# Patient Record
Sex: MALE | Race: WHITE | Employment: OTHER | URBAN - METROPOLITAN AREA
[De-identification: names, ages, dates, MRNs, and addresses within clinical notes are randomized per-mention and may not be internally consistent; named-entity substitution may affect disease eponyms.]

---

## 2017-10-13 RX ORDER — LEVOTHYROXINE SODIUM 0.05 MG/1
TABLET ORAL
Qty: 14 TABLET | Refills: 0 | Status: SHIPPED | OUTPATIENT
Start: 2017-10-13 | End: 2017-10-23 | Stop reason: SDUPTHER

## 2017-10-24 RX ORDER — LEVOTHYROXINE SODIUM 0.05 MG/1
TABLET ORAL
Qty: 14 TABLET | Refills: 0 | Status: SHIPPED | OUTPATIENT
Start: 2017-10-24 | End: 2017-10-26 | Stop reason: SDUPTHER

## 2017-10-26 RX ORDER — LEVOTHYROXINE SODIUM 0.05 MG/1
TABLET ORAL
Qty: 90 TABLET | Refills: 1 | Status: SHIPPED | OUTPATIENT
Start: 2017-10-26 | End: 2017-10-31 | Stop reason: SDUPTHER

## 2017-10-31 ENCOUNTER — OFFICE VISIT (OUTPATIENT)
Dept: FAMILY MEDICINE CLINIC | Age: 67
End: 2017-10-31
Payer: MEDICARE

## 2017-10-31 VITALS
HEIGHT: 66 IN | BODY MASS INDEX: 25.55 KG/M2 | DIASTOLIC BLOOD PRESSURE: 70 MMHG | HEART RATE: 68 BPM | SYSTOLIC BLOOD PRESSURE: 126 MMHG | WEIGHT: 159 LBS

## 2017-10-31 DIAGNOSIS — Z23 IMMUNIZATION DUE: ICD-10-CM

## 2017-10-31 DIAGNOSIS — E03.9 HYPOTHYROIDISM, UNSPECIFIED TYPE: ICD-10-CM

## 2017-10-31 DIAGNOSIS — G20 PARKINSON DISEASE (HCC): Primary | ICD-10-CM

## 2017-10-31 PROCEDURE — 1036F TOBACCO NON-USER: CPT | Performed by: FAMILY MEDICINE

## 2017-10-31 PROCEDURE — G8598 ASA/ANTIPLAT THER USED: HCPCS | Performed by: FAMILY MEDICINE

## 2017-10-31 PROCEDURE — G8419 CALC BMI OUT NRM PARAM NOF/U: HCPCS | Performed by: FAMILY MEDICINE

## 2017-10-31 PROCEDURE — G0009 ADMIN PNEUMOCOCCAL VACCINE: HCPCS | Performed by: FAMILY MEDICINE

## 2017-10-31 PROCEDURE — 90732 PPSV23 VACC 2 YRS+ SUBQ/IM: CPT | Performed by: FAMILY MEDICINE

## 2017-10-31 PROCEDURE — G8427 DOCREV CUR MEDS BY ELIG CLIN: HCPCS | Performed by: FAMILY MEDICINE

## 2017-10-31 PROCEDURE — 1123F ACP DISCUSS/DSCN MKR DOCD: CPT | Performed by: FAMILY MEDICINE

## 2017-10-31 PROCEDURE — 4040F PNEUMOC VAC/ADMIN/RCVD: CPT | Performed by: FAMILY MEDICINE

## 2017-10-31 PROCEDURE — 3017F COLORECTAL CA SCREEN DOC REV: CPT | Performed by: FAMILY MEDICINE

## 2017-10-31 PROCEDURE — G0008 ADMIN INFLUENZA VIRUS VAC: HCPCS | Performed by: FAMILY MEDICINE

## 2017-10-31 PROCEDURE — 90688 IIV4 VACCINE SPLT 0.5 ML IM: CPT | Performed by: FAMILY MEDICINE

## 2017-10-31 PROCEDURE — G8484 FLU IMMUNIZE NO ADMIN: HCPCS | Performed by: FAMILY MEDICINE

## 2017-10-31 PROCEDURE — 99213 OFFICE O/P EST LOW 20 MIN: CPT | Performed by: FAMILY MEDICINE

## 2017-10-31 RX ORDER — LEVOTHYROXINE SODIUM 0.05 MG/1
TABLET ORAL
Qty: 90 TABLET | Refills: 3 | Status: SHIPPED | OUTPATIENT
Start: 2017-10-31 | End: 2020-02-27

## 2017-10-31 ASSESSMENT — PATIENT HEALTH QUESTIONNAIRE - PHQ9
SUM OF ALL RESPONSES TO PHQ9 QUESTIONS 1 & 2: 0
1. LITTLE INTEREST OR PLEASURE IN DOING THINGS: 0
2. FEELING DOWN, DEPRESSED OR HOPELESS: 0
SUM OF ALL RESPONSES TO PHQ QUESTIONS 1-9: 0

## 2017-10-31 ASSESSMENT — ENCOUNTER SYMPTOMS
BACK PAIN: 1
ABDOMINAL PAIN: 0
COUGH: 0
WHEEZING: 0
DIARRHEA: 0
BLOOD IN STOOL: 0
CONSTIPATION: 0
SHORTNESS OF BREATH: 0

## 2017-10-31 NOTE — PROGRESS NOTES
left index     Family History   Problem Relation Age of Onset    High Blood Pressure Mother     Cancer Mother      lymphoma    Diabetes Father     High Blood Pressure Father     Cancer Father      lung    Cancer Sister      basal and squamous cell skin    Other Maternal Grandfather      suicide, shot himself     Social History   Substance Use Topics    Smoking status: Former Smoker     Quit date: 1/29/1998    Smokeless tobacco: Never Used    Alcohol use No      Current Outpatient Prescriptions   Medication Sig Dispense Refill    levothyroxine (SYNTHROID) 50 MCG tablet TAKE 1 TABLET DAILY 90 tablet 3    simvastatin (ZOCOR) 20 MG tablet TAKE 1 TABLET NIGHTLY 90 tablet 1    ALPRAZolam (XANAX) 0.5 MG tablet Take 1 tablet by mouth 3 times daily as needed 270 tablet 0    minocycline (MINOCIN;DYNACIN) 50 MG capsule TAKE 1 CAPSULE TWICE A  capsule 3    divalproex (DEPAKOTE) 500 MG DR tablet Take 1 tablet by mouth every 8 hours for 14 days. 42 tablet 0    QUEtiapine (SEROQUEL) 200 MG tablet Take 1 tablet by mouth daily for 14 days. 14 tablet 0    QUEtiapine (SEROQUEL) 400 MG tablet Take 1 tablet by mouth nightly for 14 days. 14 tablet 0    gabapentin (NEURONTIN) 300 MG capsule Take 300 mg by mouth 2 times daily.  docusate sodium (COLACE) 100 MG capsule Take 100 mg by mouth 2 times daily.  amLODIPine (NORVASC) 5 MG tablet Take 5 mg by mouth daily.  AZILECT 0.5 MG TABS TAKE 1 TABLET DAILY 30 tablet 1    sildenafil (VIAGRA) 100 MG tablet Take 1 tablet by mouth as needed for Erectile Dysfunction. 12 tablet 3    aspirin 81 MG EC tablet Take 81 mg by mouth daily. No current facility-administered medications for this visit. No Known Allergies      Subjective:   Review of Systems   Constitutional: Negative for chills, diaphoresis, fatigue and fever. HENT: Negative for congestion and hearing loss. Eyes: Negative for visual disturbance.    Respiratory: Negative for cough, 23-valent >= 3yo subcutaneous/IM (PNEUMOVAX 23)   3. Hypothyroidism, unspecified type  levothyroxine (SYNTHROID) 50 MCG tablet    CBC Auto Differential    Comprehensive Metabolic Panel    Lipid Panel    T4    TSH without Reflex    Psa screening         Plan:      No Follow-up on file. Orders Placed This Encounter   Procedures    INFLUENZA, QUADV, 3 YRS AND OLDER, IM, MDV, 0.5ML (FLUZONE QUADV)    Pneumococcal polysaccharide vaccine 23-valent >= 3yo subcutaneous/IM (PNEUMOVAX 23)    CBC Auto Differential     Standing Status:   Future     Standing Expiration Date:   10/31/2018    Comprehensive Metabolic Panel     Standing Status:   Future     Standing Expiration Date:   10/31/2018    Lipid Panel     Standing Status:   Future     Standing Expiration Date:   10/31/2018     Order Specific Question:   Is Patient Fasting?/# of Hours     Answer:   12 hour fast    T4     Standing Status:   Future     Standing Expiration Date:   10/31/2018    TSH without Reflex     Standing Status:   Future     Standing Expiration Date:   10/31/2018    Psa screening     Standing Status:   Future     Standing Expiration Date:   10/31/2018     Orders Placed This Encounter   Medications    levothyroxine (SYNTHROID) 50 MCG tablet     Sig: TAKE 1 TABLET DAILY     Dispense:  90 tablet     Refill:  3     Will change Rx if lab tests indicate change is needed.

## 2017-12-19 RX ORDER — SIMVASTATIN 20 MG
TABLET ORAL
Qty: 90 TABLET | Refills: 1 | Status: CANCELLED | OUTPATIENT
Start: 2017-12-19

## 2017-12-19 RX ORDER — SIMVASTATIN 20 MG
TABLET ORAL
Qty: 90 TABLET | Refills: 1 | OUTPATIENT
Start: 2017-12-19

## 2017-12-19 RX ORDER — SIMVASTATIN 20 MG
TABLET ORAL
Qty: 90 TABLET | Refills: 3 | Status: SHIPPED | OUTPATIENT
Start: 2017-12-19 | End: 2017-12-20 | Stop reason: SDUPTHER

## 2017-12-19 RX ORDER — SIMVASTATIN 20 MG
TABLET ORAL
Qty: 10 TABLET | Refills: 0 | Status: CANCELLED | OUTPATIENT
Start: 2017-12-19

## 2017-12-20 RX ORDER — SIMVASTATIN 20 MG
TABLET ORAL
Qty: 10 TABLET | Refills: 0 | Status: SHIPPED | OUTPATIENT
Start: 2017-12-20 | End: 2020-02-27

## 2018-02-21 ENCOUNTER — TELEPHONE (OUTPATIENT)
Dept: FAMILY MEDICINE CLINIC | Age: 68
End: 2018-02-21

## 2018-04-17 RX ORDER — LEVOTHYROXINE SODIUM 0.05 MG/1
TABLET ORAL
Qty: 90 TABLET | Refills: 1 | Status: SHIPPED | OUTPATIENT
Start: 2018-04-17 | End: 2018-10-14 | Stop reason: SDUPTHER

## 2018-10-14 RX ORDER — LEVOTHYROXINE SODIUM 0.05 MG/1
TABLET ORAL
Qty: 90 TABLET | Refills: 1 | Status: SHIPPED | OUTPATIENT
Start: 2018-10-14 | End: 2019-04-12 | Stop reason: SDUPTHER

## 2018-12-03 RX ORDER — SIMVASTATIN 20 MG
TABLET ORAL
Qty: 90 TABLET | Refills: 3 | Status: SHIPPED | OUTPATIENT
Start: 2018-12-03 | End: 2019-11-30 | Stop reason: SDUPTHER

## 2018-12-03 NOTE — TELEPHONE ENCOUNTER
Next Visit Date:  No future appointments.     Health Maintenance   Topic Date Due    AAA screen  1950    Hepatitis C screen  1950    Shingles Vaccine (1 of 2 - 2 Dose Series) 08/27/2002    Colon cancer screen colonoscopy  08/27/2002    TSH testing  02/01/2014    Lipid screen  08/07/2018    Flu vaccine (1) 09/01/2018    Pneumococcal low/med risk (2 of 2 - PCV13) 10/31/2018    DTaP/Tdap/Td vaccine (2 - Td) 06/04/2025       No results found for: LABA1C          ( goal A1C is < 7)   No results found for: LABMICR  LDL Cholesterol (mg/dL)   Date Value   08/07/2013 55   02/01/2013 49       (goal LDL is <100)   AST (U/L)   Date Value   08/21/2013 20     ALT (U/L)   Date Value   08/21/2013 3 (L)     BUN (mg/dL)   Date Value   07/27/2014 12     BP Readings from Last 3 Encounters:   10/31/17 126/70   09/03/13 120/70   08/28/13 100/64          (goal 120/80)    All Future Testing planned in [facility]  Lab Frequency Next Occurrence               Patient Active Problem List:     ASHD (arteriosclerotic heart disease)     Hypothyroidism     MVP (mitral valve prolapse)     Rosacea     Bronchitis, chronic (HCC)     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

## 2018-12-06 ENCOUNTER — IMMUNIZATION (OUTPATIENT)
Dept: FAMILY MEDICINE CLINIC | Age: 68
End: 2018-12-06
Payer: COMMERCIAL

## 2018-12-06 PROCEDURE — G0008 ADMIN INFLUENZA VIRUS VAC: HCPCS | Performed by: FAMILY MEDICINE

## 2018-12-06 PROCEDURE — 90688 IIV4 VACCINE SPLT 0.5 ML IM: CPT | Performed by: FAMILY MEDICINE

## 2019-04-12 RX ORDER — LEVOTHYROXINE SODIUM 0.05 MG/1
TABLET ORAL
Qty: 90 TABLET | Refills: 1 | Status: SHIPPED | OUTPATIENT
Start: 2019-04-12 | End: 2019-10-09 | Stop reason: SDUPTHER

## 2019-04-12 NOTE — TELEPHONE ENCOUNTER
No future appointments.     Health Maintenance   Topic Date Due    AAA screen  1950    Hepatitis C screen  1950    Shingles Vaccine (1 of 2) 08/27/2002    Colon cancer screen colonoscopy  08/27/2002    TSH testing  02/01/2014    Lipid screen  08/07/2018    Pneumococcal 65+ years Vaccine (2 of 2 - PCV13) 10/31/2018    DTaP/Tdap/Td vaccine (3 - Td) 06/04/2025    Flu vaccine  Completed       No results found for: LABA1C          ( goal A1C is < 7)   No results found for: LABMICR  LDL Cholesterol (mg/dL)   Date Value   08/07/2013 55   02/01/2013 49       (goal LDL is <100)   AST (U/L)   Date Value   08/21/2013 20     ALT (U/L)   Date Value   08/21/2013 3 (L)     BUN (mg/dL)   Date Value   07/27/2014 12     BP Readings from Last 3 Encounters:   10/31/17 126/70   09/03/13 120/70   08/28/13 100/64          (goal 120/80)    All Future Testing planned in [facility]  Lab Frequency Next Occurrence               Patient Active Problem List:     ASHD (arteriosclerotic heart disease)     Hypothyroidism     MVP (mitral valve prolapse)     Rosacea     Bronchitis, chronic (HCC)     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

## 2019-10-09 RX ORDER — LEVOTHYROXINE SODIUM 0.05 MG/1
TABLET ORAL
Qty: 90 TABLET | Refills: 4 | Status: SHIPPED | OUTPATIENT
Start: 2019-10-09 | End: 2021-01-01 | Stop reason: SDUPTHER

## 2019-10-24 ENCOUNTER — IMMUNIZATION (OUTPATIENT)
Dept: FAMILY MEDICINE CLINIC | Age: 69
End: 2019-10-24
Payer: MEDICARE

## 2019-10-24 DIAGNOSIS — Z23 IMMUNIZATION DUE: Primary | ICD-10-CM

## 2019-10-24 PROCEDURE — 90688 IIV4 VACCINE SPLT 0.5 ML IM: CPT | Performed by: FAMILY MEDICINE

## 2019-10-24 PROCEDURE — G0008 ADMIN INFLUENZA VIRUS VAC: HCPCS | Performed by: FAMILY MEDICINE

## 2019-12-02 RX ORDER — SIMVASTATIN 20 MG
TABLET ORAL
Qty: 90 TABLET | Refills: 4 | Status: SHIPPED | OUTPATIENT
Start: 2019-12-02

## 2020-01-01 ENCOUNTER — HOSPITAL ENCOUNTER (OUTPATIENT)
Dept: RADIATION ONCOLOGY | Facility: MEDICAL CENTER | Age: 70
Discharge: HOME | End: 2020-11-17
Payer: MEDICARE

## 2020-01-01 ENCOUNTER — HOSPITAL ENCOUNTER (OUTPATIENT)
Dept: RADIATION ONCOLOGY | Facility: MEDICAL CENTER | Age: 70
Discharge: HOME | End: 2020-11-02
Payer: MEDICARE

## 2020-01-01 ENCOUNTER — HOSPITAL ENCOUNTER (OUTPATIENT)
Dept: RADIATION ONCOLOGY | Facility: MEDICAL CENTER | Age: 70
Discharge: HOME | End: 2020-10-13
Attending: RADIOLOGY
Payer: MEDICARE

## 2020-01-01 ENCOUNTER — HOSPITAL ENCOUNTER (OUTPATIENT)
Dept: RADIATION ONCOLOGY | Facility: MEDICAL CENTER | Age: 70
Discharge: HOME | End: 2020-11-13
Payer: MEDICARE

## 2020-01-01 ENCOUNTER — HOSPITAL ENCOUNTER (OUTPATIENT)
Dept: RADIATION ONCOLOGY | Facility: MEDICAL CENTER | Age: 70
Discharge: HOME | End: 2020-11-09
Payer: MEDICARE

## 2020-01-01 ENCOUNTER — HOSPITAL ENCOUNTER (OUTPATIENT)
Dept: RADIATION ONCOLOGY | Facility: MEDICAL CENTER | Age: 70
Discharge: HOME | End: 2020-11-18
Payer: MEDICARE

## 2020-01-01 ENCOUNTER — HOSPITAL ENCOUNTER (OUTPATIENT)
Dept: RADIATION ONCOLOGY | Facility: MEDICAL CENTER | Age: 70
Discharge: HOME | End: 2020-10-29
Payer: MEDICARE

## 2020-01-01 ENCOUNTER — APPOINTMENT (OUTPATIENT)
Dept: RADIATION ONCOLOGY | Facility: MEDICAL CENTER | Age: 70
End: 2020-01-01
Payer: MEDICARE

## 2020-01-01 ENCOUNTER — TELEPHONE (OUTPATIENT)
Dept: FAMILY MEDICINE CLINIC | Age: 70
End: 2020-01-01

## 2020-01-01 ENCOUNTER — HOSPITAL ENCOUNTER (OUTPATIENT)
Dept: RADIATION ONCOLOGY | Facility: MEDICAL CENTER | Age: 70
Discharge: HOME | End: 2020-11-10
Payer: MEDICARE

## 2020-01-01 ENCOUNTER — HOSPITAL ENCOUNTER (OUTPATIENT)
Dept: RADIATION ONCOLOGY | Facility: MEDICAL CENTER | Age: 70
Discharge: HOME | End: 2020-11-16
Payer: MEDICARE

## 2020-01-01 ENCOUNTER — TELEPHONE (OUTPATIENT)
Dept: CASE MANAGEMENT | Age: 70
End: 2020-01-01

## 2020-01-01 ENCOUNTER — TELEPHONE (OUTPATIENT)
Dept: RADIATION ONCOLOGY | Facility: MEDICAL CENTER | Age: 70
End: 2020-01-01

## 2020-01-01 ENCOUNTER — HOSPITAL ENCOUNTER (OUTPATIENT)
Dept: RADIATION ONCOLOGY | Facility: MEDICAL CENTER | Age: 70
Discharge: HOME | End: 2020-11-05
Payer: MEDICARE

## 2020-01-01 ENCOUNTER — HOSPITAL ENCOUNTER (OUTPATIENT)
Dept: RADIATION ONCOLOGY | Facility: MEDICAL CENTER | Age: 70
Discharge: HOME | End: 2020-11-03
Payer: MEDICARE

## 2020-01-01 ENCOUNTER — HOSPITAL ENCOUNTER (OUTPATIENT)
Dept: RADIATION ONCOLOGY | Facility: MEDICAL CENTER | Age: 70
Discharge: HOME | End: 2020-11-04
Payer: MEDICARE

## 2020-01-01 ENCOUNTER — TELEPHONE (OUTPATIENT)
Dept: SPIRITUAL SERVICES | Age: 70
End: 2020-01-01

## 2020-01-01 ENCOUNTER — SOCIAL WORK (OUTPATIENT)
Dept: ONCOLOGY | Age: 70
End: 2020-01-01

## 2020-01-01 ENCOUNTER — HOSPITAL ENCOUNTER (OUTPATIENT)
Dept: RADIATION ONCOLOGY | Facility: MEDICAL CENTER | Age: 70
Discharge: HOME | End: 2020-11-11
Payer: MEDICARE

## 2020-01-01 ENCOUNTER — HOSPITAL ENCOUNTER (OUTPATIENT)
Dept: RADIATION ONCOLOGY | Facility: MEDICAL CENTER | Age: 70
Discharge: HOME | End: 2020-11-12
Payer: MEDICARE

## 2020-01-01 ENCOUNTER — IMMUNIZATION (OUTPATIENT)
Dept: FAMILY MEDICINE CLINIC | Age: 70
End: 2020-01-01
Payer: MEDICARE

## 2020-01-01 ENCOUNTER — HOSPITAL ENCOUNTER (OUTPATIENT)
Dept: RADIATION ONCOLOGY | Facility: MEDICAL CENTER | Age: 70
Discharge: HOME | End: 2020-10-02
Payer: MEDICARE

## 2020-01-01 ENCOUNTER — HOSPITAL ENCOUNTER (OUTPATIENT)
Dept: RADIATION ONCOLOGY | Facility: MEDICAL CENTER | Age: 70
Discharge: HOME | End: 2020-11-06
Payer: MEDICARE

## 2020-01-01 ENCOUNTER — HOSPITAL ENCOUNTER (OUTPATIENT)
Dept: RADIATION ONCOLOGY | Facility: MEDICAL CENTER | Age: 70
Discharge: HOME | End: 2020-10-30
Payer: MEDICARE

## 2020-01-01 VITALS
RESPIRATION RATE: 16 BRPM | DIASTOLIC BLOOD PRESSURE: 77 MMHG | OXYGEN SATURATION: 96 % | BODY MASS INDEX: 22.73 KG/M2 | SYSTOLIC BLOOD PRESSURE: 131 MMHG | HEART RATE: 60 BPM | TEMPERATURE: 97 F | HEIGHT: 66 IN

## 2020-01-01 VITALS
RESPIRATION RATE: 16 BRPM | DIASTOLIC BLOOD PRESSURE: 72 MMHG | WEIGHT: 172 LBS | OXYGEN SATURATION: 96 % | HEART RATE: 62 BPM | BODY MASS INDEX: 27.76 KG/M2 | SYSTOLIC BLOOD PRESSURE: 119 MMHG | TEMPERATURE: 97 F

## 2020-01-01 VITALS
BODY MASS INDEX: 27.28 KG/M2 | HEART RATE: 63 BPM | TEMPERATURE: 97.2 F | RESPIRATION RATE: 20 BRPM | DIASTOLIC BLOOD PRESSURE: 66 MMHG | WEIGHT: 169 LBS | OXYGEN SATURATION: 98 % | SYSTOLIC BLOOD PRESSURE: 113 MMHG

## 2020-01-01 VITALS
BODY MASS INDEX: 27.28 KG/M2 | DIASTOLIC BLOOD PRESSURE: 63 MMHG | TEMPERATURE: 96.6 F | SYSTOLIC BLOOD PRESSURE: 119 MMHG | RESPIRATION RATE: 16 BRPM | WEIGHT: 169 LBS | HEART RATE: 63 BPM | OXYGEN SATURATION: 95 %

## 2020-01-01 PROCEDURE — 77014 PR CT GUIDANCE PLACEMENT RAD THERAPY FIELDS: CPT | Performed by: RADIOLOGY

## 2020-01-01 PROCEDURE — 77301 RADIOTHERAPY DOSE PLAN IMRT: CPT

## 2020-01-01 PROCEDURE — 99213 OFFICE O/P EST LOW 20 MIN: CPT

## 2020-01-01 PROCEDURE — 77427 RADIATION TX MANAGEMENT X5: CPT | Performed by: RADIOLOGY

## 2020-01-01 PROCEDURE — 77336 RADIATION PHYSICS CONSULT: CPT

## 2020-01-01 PROCEDURE — 77386 HC NTSTY MODUL RAD TX DLVR CPLX: CPT

## 2020-01-01 PROCEDURE — 77290 THER RAD SIMULAJ FIELD CPLX: CPT

## 2020-01-01 PROCEDURE — 77338 DESIGN MLC DEVICE FOR IMRT: CPT

## 2020-01-01 PROCEDURE — 77338 DESIGN MLC DEVICE FOR IMRT: CPT | Performed by: RADIOLOGY

## 2020-01-01 PROCEDURE — G0008 ADMIN INFLUENZA VIRUS VAC: HCPCS | Performed by: FAMILY MEDICINE

## 2020-01-01 PROCEDURE — 77300 RADIATION THERAPY DOSE PLAN: CPT

## 2020-01-01 PROCEDURE — 77300 RADIATION THERAPY DOSE PLAN: CPT | Performed by: RADIOLOGY

## 2020-01-01 PROCEDURE — 77301 RADIOTHERAPY DOSE PLAN IMRT: CPT | Performed by: RADIOLOGY

## 2020-01-01 PROCEDURE — 99204 OFFICE O/P NEW MOD 45 MIN: CPT | Performed by: RADIOLOGY

## 2020-01-01 PROCEDURE — 77334 RADIATION TREATMENT AID(S): CPT | Performed by: RADIOLOGY

## 2020-01-01 PROCEDURE — 77290 THER RAD SIMULAJ FIELD CPLX: CPT | Performed by: RADIOLOGY

## 2020-01-01 PROCEDURE — 77263 THER RADIOLOGY TX PLNG CPLX: CPT | Performed by: RADIOLOGY

## 2020-01-01 PROCEDURE — 90694 VACC AIIV4 NO PRSRV 0.5ML IM: CPT | Performed by: FAMILY MEDICINE

## 2020-01-01 PROCEDURE — 77334 RADIATION TREATMENT AID(S): CPT

## 2020-01-01 RX ORDER — DILTIAZEM HYDROCHLORIDE 240 MG/1
240 CAPSULE, COATED, EXTENDED RELEASE ORAL DAILY
COMMUNITY
End: 2021-01-01 | Stop reason: ALTCHOICE

## 2020-01-01 RX ORDER — OLANZAPINE 10 MG/1
10 TABLET ORAL NIGHTLY
COMMUNITY

## 2020-01-01 RX ORDER — DIVALPROEX SODIUM 250 MG/1
250 TABLET, DELAYED RELEASE ORAL NIGHTLY
COMMUNITY
End: 2021-01-01 | Stop reason: ALTCHOICE

## 2020-01-01 RX ORDER — ATORVASTATIN CALCIUM 10 MG/1
10 TABLET, FILM COATED ORAL DAILY
COMMUNITY

## 2020-02-27 ENCOUNTER — OFFICE VISIT (OUTPATIENT)
Dept: FAMILY MEDICINE CLINIC | Age: 70
End: 2020-02-27
Payer: MEDICARE

## 2020-02-27 ENCOUNTER — HOSPITAL ENCOUNTER (OUTPATIENT)
Age: 70
Setting detail: SPECIMEN
Discharge: HOME | End: 2020-02-27
Payer: MEDICARE

## 2020-02-27 VITALS
SYSTOLIC BLOOD PRESSURE: 116 MMHG | HEART RATE: 79 BPM | BODY MASS INDEX: 22.63 KG/M2 | HEIGHT: 66 IN | WEIGHT: 140.8 LBS | DIASTOLIC BLOOD PRESSURE: 60 MMHG | OXYGEN SATURATION: 91 %

## 2020-02-27 LAB
ABSOLUTE EOS #: 0.3 K/UL (ref 0–0.44)
ABSOLUTE IMMATURE GRANULOCYTE: 0.03 K/UL (ref 0–0.3)
ABSOLUTE LYMPH #: 1.04 K/UL (ref 1.1–3.7)
ABSOLUTE MONO #: 0.72 K/UL (ref 0.1–1.2)
ALBUMIN SERPL-MCNC: 3.4 G/DL (ref 3.5–5.2)
ALBUMIN/GLOBULIN RATIO: 1 (ref 1–2.5)
ALP BLD-CCNC: 68 U/L (ref 40–129)
ALT SERPL-CCNC: <5 U/L (ref 5–41)
ANION GAP SERPL CALCULATED.3IONS-SCNC: 16 MMOL/L (ref 9–17)
AST SERPL-CCNC: 21 U/L
BASOPHILS # BLD: 0 % (ref 0–2)
BASOPHILS ABSOLUTE: <0.03 K/UL (ref 0–0.2)
BILIRUB SERPL-MCNC: 0.8 MG/DL (ref 0.3–1.2)
BUN BLDV-MCNC: 14 MG/DL (ref 8–23)
BUN/CREAT BLD: ABNORMAL (ref 9–20)
CALCIUM SERPL-MCNC: 9.1 MG/DL (ref 8.6–10.4)
CHLORIDE BLD-SCNC: 99 MMOL/L (ref 98–107)
CHOLESTEROL/HDL RATIO: 4.5
CHOLESTEROL: 136 MG/DL
CO2: 22 MMOL/L (ref 20–31)
CREAT SERPL-MCNC: 0.95 MG/DL (ref 0.7–1.2)
DIFFERENTIAL TYPE: ABNORMAL
EOSINOPHILS RELATIVE PERCENT: 4 % (ref 1–4)
GFR AFRICAN AMERICAN: >60 ML/MIN
GFR NON-AFRICAN AMERICAN: >60 ML/MIN
GFR SERPL CREATININE-BSD FRML MDRD: ABNORMAL ML/MIN/{1.73_M2}
GFR SERPL CREATININE-BSD FRML MDRD: ABNORMAL ML/MIN/{1.73_M2}
GLUCOSE BLD-MCNC: 94 MG/DL (ref 70–99)
HCT VFR BLD CALC: 43.3 % (ref 40.7–50.3)
HDLC SERPL-MCNC: 30 MG/DL
HEMOGLOBIN: 13.6 G/DL (ref 13–17)
IMMATURE GRANULOCYTES: 0 %
LDL CHOLESTEROL: 84 MG/DL (ref 0–130)
LYMPHOCYTES # BLD: 13 % (ref 24–43)
MCH RBC QN AUTO: 29.4 PG (ref 25.2–33.5)
MCHC RBC AUTO-ENTMCNC: 31.4 G/DL (ref 28.4–34.8)
MCV RBC AUTO: 93.7 FL (ref 82.6–102.9)
MONOCYTES # BLD: 9 % (ref 3–12)
NRBC AUTOMATED: 0 PER 100 WBC
PDW BLD-RTO: 13.9 % (ref 11.8–14.4)
PLATELET # BLD: ABNORMAL K/UL (ref 138–453)
PLATELET ESTIMATE: ABNORMAL
PLATELET, FLUORESCENCE: 135 K/UL (ref 138–453)
PLATELET, IMMATURE FRACTION: 7.7 % (ref 1.1–10.3)
PMV BLD AUTO: ABNORMAL FL (ref 8.1–13.5)
POTASSIUM SERPL-SCNC: 3.6 MMOL/L (ref 3.7–5.3)
PROSTATE SPECIFIC ANTIGEN: 1.01 UG/L
RBC # BLD: 4.62 M/UL (ref 4.21–5.77)
RBC # BLD: ABNORMAL 10*6/UL
SEG NEUTROPHILS: 75 % (ref 36–65)
SEGMENTED NEUTROPHILS ABSOLUTE COUNT: 6.16 K/UL (ref 1.5–8.1)
SODIUM BLD-SCNC: 137 MMOL/L (ref 135–144)
T4 TOTAL: 6.6 UG/DL (ref 4.5–12)
TOTAL PROTEIN: 6.9 G/DL (ref 6.4–8.3)
TRIGL SERPL-MCNC: 109 MG/DL
TSH SERPL DL<=0.05 MIU/L-ACNC: 0.49 MIU/L (ref 0.3–5)
VLDLC SERPL CALC-MCNC: ABNORMAL MG/DL (ref 1–30)
WBC # BLD: 8.3 K/UL (ref 3.5–11.3)
WBC # BLD: ABNORMAL 10*3/UL

## 2020-02-27 PROCEDURE — 99213 OFFICE O/P EST LOW 20 MIN: CPT | Performed by: FAMILY MEDICINE

## 2020-02-27 RX ORDER — CLONAZEPAM 0.5 MG/1
TABLET ORAL
COMMUNITY
Start: 2019-11-21 | End: 2021-01-01

## 2020-02-27 RX ORDER — AZITHROMYCIN 250 MG/1
TABLET, FILM COATED ORAL
Qty: 1 PACKET | Refills: 0 | Status: SHIPPED | OUTPATIENT
Start: 2020-02-27 | End: 2021-01-01

## 2020-02-27 RX ORDER — OXCARBAZEPINE 300 MG/1
TABLET, FILM COATED ORAL
COMMUNITY
Start: 2019-12-08

## 2020-02-27 RX ORDER — ASPIRIN 81 MG/1
81 TABLET, CHEWABLE ORAL
COMMUNITY
End: 2020-02-27

## 2020-02-27 ASSESSMENT — ENCOUNTER SYMPTOMS
WHEEZING: 0
CONSTIPATION: 0
ABDOMINAL PAIN: 0
SHORTNESS OF BREATH: 0
BLOOD IN STOOL: 0
COUGH: 1
BACK PAIN: 0
DIARRHEA: 0

## 2020-02-27 NOTE — PROGRESS NOTES
Ishaan West is a 81 y.o. male who presents todayfor his medical conditions/complaints as noted below. Ishaan West is here today c/oURI (two weeks); Fever; and Medication Check      :     HPI    Cough for two weeks some purulence. Past Medical History:   Diagnosis Date    Bipolar 1 disorder ([address]     CAD (coronary artery disease)     Hyperlipidemia     Hypertension     MVP (mitral valve prolapse)     Parkinson disease (HCC)     Rosacea       Past Surgical History:   Procedure Laterality Date    CHOLECYSTECTOMY      CORONARY ARTERY BYPASS GRAFT      FINGER AMPUTATION      left index     Family History   Problem Relation Age of Onset    High Blood Pressure Mother     Cancer Mother         lymphoma    Diabetes Father     High Blood Pressure Father     Cancer Father         lung    Cancer Sister         basal and squamous cell skin    Other Maternal Grandfather         suicide, shot himself     Social History     Tobacco Use    Smoking status: Former Smoker     Packs/day: 1.00     Years: 1.00     Pack years: 1.00     Types: Cigarettes     Start date: 1997     Last attempt to quit: 1998     Years since quittin.0    Smokeless tobacco: Never Used   Substance Use Topics    Alcohol use: No      Current Outpatient Medications   Medication Sig Dispense Refill    clonazePAM (KLONOPIN) 0.5 MG tablet       carbidopa-levodopa (SINEMET)  MG per tablet       OXcarbazepine (TRILEPTAL) 300 MG tablet       azithromycin (ZITHROMAX Z-JOSEP) 250 MG tablet Take 2 pills on day one then one pill daily til gone. 1 packet 0    simvastatin (ZOCOR) 20 MG tablet TAKE 1 TABLET NIGHTLY 90 tablet 4    ALPRAZolam (XANAX) 0.5 MG tablet Take 1 tablet by mouth 3 times daily as needed 270 tablet 0    docusate sodium (COLACE) 100 MG capsule Take 100 mg by mouth 2 times daily.       AZILECT 0.5 MG TABS TAKE 1 TABLET DAILY 30 tablet 1    levothyroxine (SYNTHROID) 50 MCG tablet TAKE 1 TABLET DAILY (NEED APPOINTMENT) (Patient not taking: Reported on 2/27/2020) 90 tablet 4    QUEtiapine (SEROQUEL) 200 MG tablet Take 1 tablet by mouth daily for 14 days. 14 tablet 0    QUEtiapine (SEROQUEL) 400 MG tablet Take 1 tablet by mouth nightly for 14 days. 14 tablet 0    sildenafil (VIAGRA) 100 MG tablet Take 1 tablet by mouth as needed for Erectile Dysfunction. (Patient not taking: Reported on 2/27/2020) 12 tablet 3    aspirin 81 MG EC tablet Take 81 mg by mouth daily. No current facility-administered medications for this visit. No Known Allergies      Subjective:   Review of Systems   Constitutional: Positive for diaphoresis. Negative for chills, fatigue and fever. HENT: Negative for congestion and hearing loss. Eyes: Negative for visual disturbance. Respiratory: Positive for cough. Negative for shortness of breath and wheezing. Cardiovascular: Negative for chest pain, palpitations and leg swelling. Gastrointestinal: Negative for abdominal pain, blood in stool, constipation and diarrhea. Genitourinary: Negative for dysuria. Musculoskeletal: Positive for gait problem. Negative for arthralgias, back pain and neck pain. Skin: Negative for rash. Neurological: Negative for weakness, numbness and headaches. Psychiatric/Behavioral: Positive for sleep disturbance. Negative for dysphoric mood.       :   /60   Pulse 79   Ht 5' 6\" (1.676 m)   Wt 140 lb 12.8 oz (63.9 kg)   SpO2 91%   BMI 22.73 kg/m²     Physical Exam  Constitutional:       General: He is not in acute distress. Appearance: He is well-developed. He is not diaphoretic. HENT:      Head: Normocephalic and atraumatic. Mouth/Throat:      Pharynx: No oropharyngeal exudate. Eyes:      General: No scleral icterus. Right eye: No discharge. Left eye: No discharge. Neck:      Musculoskeletal: Neck supple. Thyroid: No thyromegaly. Vascular: No carotid bruit.    Cardiovascular:      Rate and 2/27/2021    TSH without Reflex     Standing Status:   Future     Standing Expiration Date:   2/27/2021    Psa screening     Standing Status:   Future     Standing Expiration Date:   2/27/2021     Orders Placed This Encounter   Medications    azithromycin (ZITHROMAX Z-JOSEP) 250 MG tablet     Sig: Take 2 pills on day one then one pill daily til gone. Dispense:  1 packet     Refill:  0   Labs today and if thyroid dose looks ok will refill as needed.

## 2020-10-02 NOTE — PROGRESS NOTES
[facility]  NEW PATIENT CONSULTATION    Date of Service: 10/2/2020  Location: [facility]    Patient ID:   Rosemary Win  : 1950   MRN: [id number]    Diagnosis: Lung cancer non-small cell lung cancer clinical stage T4 N2 M0    Chief Complaint: \"Patient has been depressed and feel weak. Dear Dr. Yogesh Figueredo,    Thank you for requesting a Radiation Oncology consultation on your patient, Rosemary Win. As you know, he is a very pleasant 43-year-old old man known with multiple medical problems including parkinsonism and depression, hypertension. He also had long history of smoking. He stopped and according to the wife 30 years ago. Because  of shortness of breath he was investigated and was found to have 7.4 cm large right suprahilar pulmonary mass with encasement of the vascular structure. Hilar node measured 2.8 cm and the right peritracheal lymph node measured 3.7 cm there was also 0.7 right middle lobe nodule concerning for malignancy. Bronchoscopy on  was not diagnostic and in  she had atrial fibrillation which was controlled. .  On 2020 fine-needle aspiration revealed adenocarcinoma. Patient subsequently referred to Dr. Teresa Collins who referred him to me for palliative radiation treatment. When I saw him today he was depressed and did not say too much his wife was the 1 giving the history.         Medical and Surgical History:  Past Medical History:   Diagnosis Date    [facility]-fib [facility])     [facility] disorder [facility]     CAD (coronary artery disease)     Cancer ([facility]     Hyperlipidemia     Hypertension     MVP (mitral valve prolapse)     Parkinson disease (HCC)     Rosacea     Thyroid disease      Past Surgical History:   Procedure Laterality Date    CHOLECYSTECTOMY      CORONARY ARTERY BYPASS GRAFT      FINGER AMPUTATION      left index    LUNG BIOPSY      x2 Dr. Rosamaria Garcia and Gina Moreno       Gyn Hx:    Social Hx:    Family Hx:    Allergies   Allergen Reactions    Ambien [Zolpidem Tartrate]      Manic episode    Ropinirole Other (See Comments)     Manic episode       Current Outpatient Medications:     atorvastatin (LIPITOR) 10 MG tablet, Take 10 mg by mouth daily, Disp: , Rfl:     divalproex (DEPAKOTE) 250 MG DR tablet, Take 250 mg by mouth nightly, Disp: , Rfl:     OLANZapine (ZYPREXA) 10 MG tablet, Take 10 mg by mouth nightly, Disp: , Rfl:     metoprolol tartrate (LOPRESSOR) 25 MG tablet, Take 25 mg by mouth 2 times daily, Disp: , Rfl:     dilTIAZem (CARDIZEM CD) 240 MG extended release capsule, Take 240 mg by mouth daily, Disp: , Rfl:     apixaban (ELIQUIS) 5 MG TABS tablet, Take by mouth 2 times daily, Disp: , Rfl:     carbidopa-levodopa (SINEMET)  MG per tablet, 1 tablet 3 times daily , Disp: , Rfl:     levothyroxine (SYNTHROID) 50 MCG tablet, TAKE 1 TABLET DAILY (NEED APPOINTMENT), Disp: 90 tablet, Rfl: 4    ALPRAZolam (XANAX) 0.5 MG tablet, Take 1 tablet by mouth 3 times daily as needed, Disp: 270 tablet, Rfl: 0    docusate sodium (COLACE) 100 MG capsule, Take 100 mg by mouth 2 times daily as needed , Disp: , Rfl:     AZILECT 0.5 MG TABS, TAKE 1 TABLET DAILY, Disp: 30 tablet, Rfl: 1    aspirin 81 MG EC tablet, Take 81 mg by mouth daily. , Disp: , Rfl:     clonazePAM (KLONOPIN) 0.5 MG tablet, , Disp: , Rfl:     OXcarbazepine (TRILEPTAL) 300 MG tablet, , Disp: , Rfl:     azithromycin (ZITHROMAX Z-JOSEP) 250 MG tablet, Take 2 pills on day one then one pill daily til gone., Disp: 1 packet, Rfl: 0    simvastatin (ZOCOR) 20 MG tablet, TAKE 1 TABLET NIGHTLY, Disp: 90 tablet, Rfl: 4    QUEtiapine (SEROQUEL) 200 MG tablet, Take 1 tablet by mouth daily for 14 days. , Disp: 14 tablet, Rfl: 0    QUEtiapine (SEROQUEL) 400 MG tablet, Take 1 tablet by mouth nightly for 14 days. , Disp: 14 tablet, Rfl: 0    sildenafil (VIAGRA) 100 MG tablet, Take 1 tablet by mouth as needed for Erectile Dysfunction.  (Patient not taking: Reported on 2/27/2020), Disp: 12 tablet, Rfl: 3      SYSTEMS REVIEW:  I reviewed the complete 14-Point Review of Systems with the patient. Pertinent ones noted in the HPI and below. ROS(+)  ROS(-)    PHYSICAL EXAMINATION:  Vital Signs: /77   Pulse 60   Temp 97 °F (36.1 °C) (Oral)   Resp 16   Ht 5' 6\" (1.676 m)   SpO2 96%   BMI 22.73 kg/m²   Pain 0/10, KPS . GENERAL: Well-appearing, in no apparent distress, alert and fully oriented, answers questions appropriately. HEENT: Normocephalic, atraumatic,  NECK: No thyromegaly, cervical or supraclavicular lymphadenopathy. LUNGS: Reveal decreased air entry more on the right. ABD: Soft, nontender, no visceromegaly or masses appreciated. LYMPH: No lymphadenopathy appreciated. EXT:No  edema. NEURO: Alert and fully oriented, answers questions appropriately because of parkinsonism he stood and walked with some difficulty. There was minor tremors noticed over his hands. PSYCH: Affect is appropriate for clinical situation. Insight and judgment do not appear impaired. LABS:    RADS:    PATHOLOGY:    ASSESSMENT: T4N2M1 adenocarcinoma of the right supra hilar region. PLAN: Case was discussed with Kathy Artis and the decision is to give him a short palliative course of radiation therapy to his tumor. Patient is fragile and could not tolerate  course of chemoradiation. I am planning to give him 3000 cGy in 10 treatment using 3D conformal Plus 900 cGy in 3 treatment to the area of bulky disease. This dose will offer him some palliation in terms of shrinking his tumor and  control some of his discomfort. Early and delayed side effects were explained to the patient and his wife. Early side effects, patient will develop dysphagia which is the most annoying part of the early side effects ,it is treated with  medical treatments. Patient might lose couple of pounds and he might feel tired. Radiation pneumonitis is not common with careful planning.   Radiation pneumonitis could have care any time during or after radiation but more common few weeks post radiation treatment. Late side effects which occur 6 months to many years and that would include some degree of fibrosis at the site of the tumor. Patient and his wife understand benefits and side effects and agreed to the treatment. We like to thank you very much for letting us participate in his care and will keep you updated on him. Susana Fuller MD, [address] [facility]  [phone] (cell)    I spent 60 minutes with the patient. >50% of the time was allotted to education, answering questions, and coordinating care. CC:  Patient Care Team:  Rubina Spencer MD as PCP - General (Family Medicine)  Rubina Spencer MD as PCP - [facility]  Lars Solis MD as Consulting Physician (Hematology and Oncology)  Jael Felton MD as Consulting Physician (Radiation Oncology)     N.B. This note is created with the assistance of a speech recognition program.  While intending to generate a document that actually reflects the content of the visit, the document can still contain errors including those of syntax and sound-alike substitutions that may escape proof reading. In such instances, actual meaning can be extrapolated by contextual diversion.

## 2020-10-02 NOTE — PROGRESS NOTES
Referring Physician: Erik Flannery    He had swollen legs and wife thought it was a heart issue and took him to [facility] back in July. Cough frequently at home. Feb ran low grade fever and got zpack, but cough continued. Cough is clear mucus. May started to go Manic. Biopsy on - Aug 12th in hospital for manic episode and changed his meds. SHe states patient is depressed. Depakote only takes at night due to sleeping all day. Diagnosed with Afib and placed on eliquis. Wanting psyciatrist referral. Sher Shelley is at [facility] current psyciatrist. Dr. Veronica Chang to [facility].    Vitals:    10/02/20 0822   BP: 131/77   Pulse: 60   Resp: 16   Temp: 97 °F (36.1 °C)   SpO2: 96%    :  Patient Currently in Pain: No             Wt Readings from Last 1 Encounters:   20 140 lb 12.8 oz (63.9 kg)        Body mass index is 22.73 kg/m². Height: 5' 6\" (167.6 cm)         Immunizations:    Influenza status:    [x]   Current   []   Patient declined    Pneumococcal status:  [x]   Current  []   Patient declined    Smoking Status:    [] Smoker - PPD:   [x] Nonsmoker - Quit Date: 0              [] Never a smoker      No chief complaint on file. Cancer Staging  No matching staging information was found for the patient. Prior Radiation Therapy? No   If yes, site treated:   Facility:                             Date:    Concurrent Chemo/radiation? No   If yes, start date:    Prior Chemotherapy? No   If yes    Facility:                             Date:    Prior Hormonal Therapy? No   If yes   Facility:                             Date:    Head and Neck Cancer? No   If yes, please remind physician to place swallow study order and speech therapy order.       Pacemaker/Defibulator/ICD:  No             BREAST/GYN  Patient only:     LMP:    Age at first Menses:    Para:    :    Cup size:    Lymphedema Evaluation[de-identified]   [] left arm      [] right arm  Location:     Measurement (cm)    Upper Bicep :    Lower Bicep :           Current disease        Past Surgical History:   Procedure Laterality Date    CHOLECYSTECTOMY      CORONARY ARTERY BYPASS GRAFT      FINGER AMPUTATION      left index    LUNG BIOPSY      x2 Dr. Rosamaria Garcia and Gian Moreno       Family History   Problem Relation Age of Onset    High Blood Pressure Mother     Cancer Mother         lymphoma    Diabetes Father     High Blood Pressure Father     Cancer Father         lung    Cancer Sister         basal and squamous cell skin    Other Maternal Grandfather         suicide, shot himself    Cancer Brother        Social History     Socioeconomic History    Marital status:      Spouse name: Not on file    Number of children: 11    Years of education: Not on file    Highest education level: Not on file   Occupational History     Employer: [employer] Financial resource strain: Not on file    Food insecurity     Worry: Not on file     Inability: Not on file    Transportation needs     Medical: Not on file     Non-medical: Not on file   Tobacco Use    Smoking status: Former Smoker     Packs/day: 1.00     Years: 1.00     Pack years: 1.00     Types: Cigarettes     Start date: 1997     Last attempt to quit: 1998     Years since quittin.6    Smokeless tobacco: Never Used   Substance and Sexual Activity    Alcohol use: No    Drug use: No    Sexual activity: Not on file   Lifestyle    Physical activity     Days per week: Not on file     Minutes per session: Not on file    Stress: Not on file   Relationships    Social connections     Talks on phone: Not on file     Gets together: Not on file     Attends Zoroastrianism service: Not on file     Active member of club or organization: Not on file     Attends meetings of clubs or organizations: Not on file     Relationship status: Not on file    Intimate partner violence     Fear of current or ex partner: Not on file     Emotionally abused: Not on file     Physically abused: Not on file     Forced sexual activity: Not on file   Other Topics Concern    Not on file   Social History Narrative    Not on file             FALLS RISK SCREEN  Instructions:  Assess the patient and enter the appropriate indicators that are present for fall risk identification. Total the numbers entered and assign a fall risk score from Table 2.  Reassess patient at a minimum every 12 weeks or with status change. Assessment   Date  10/2/2020     1. Mental Ability: confusion/cognitively impaired 1     2. Elimination Issues: incontinence, frequency 0       3. Ambulatory: use of assistive devices (walker, cane, off-loading devices),        attached to equipment (IV pole, oxygen) 1     4. Sensory Limitations: dizziness, vertigo, impaired vision 0     5. Age less than 65        0     6. Age 72 or greater 1     7. Medication: diuretics, strong analgesics, hypnotics, sedatives,        antihypertensive agents 1   8. Falls:  recent history of falls within the last 3 months (not to include slipping or        tripping) 0   TOTAL 4    If score of 4 or greater was education given? Yes and education provided. TABLE 2   Risk Score Risk Level Plan of Care   0-3 Little or  No Risk 1. Provide assistance as indicated for ambulation activities  2. Reorient confused/cognitively impaired patient  3. Call-light/bell within patient's reach  4. Chair/bed in low position, stretcher/bed with siderails up except when performing patient care activities  5. Educate patient/family/caregiver on falls prevention  6.  Reassess in 12 weeks or with any noted change in patient condition which places them at a risk for a fall   4-6 Moderate Risk 1. Provide assistance as indicated for ambulation activities  2. Reorient confused/cognitively impaired patient  3. Call-light/bell within patient's reach  4. Chair/bed in low position, stretcher/bed with siderails up except when performing patient care activities  5.

## 2020-10-05 NOTE — PROGRESS NOTES
[facility] Nutrition Note  PG-SGA screening tool reviewed with score of 6    Patient Reports:  1. Weight: decreased (1) - reports current weight of 159#, one month ago 175#, six months ago 145#  2. Food Intake: no change (0)  3. Symptoms: Patient marked both no problems eating and no appetite/just did not feel like eating (3)  4. Activities and function: patient marked \"not normal self, but able to be up and about with fairly normal activities\" - Person helping fill out reports marked \"able to do little activity and spend most of the day in bed or chair (2)    *Full assessment for scores > 4. Height: 5' 6\" (167.6 cm)    Current Weight: 140 lb 12.8 oz (63.9 kg)  BMI: Body mass index is 22.73 kg/m². Will follow up with patient. Recommend monitoring patient's weight and for potential side effects from CA itself and/or tx.     Antonino Cruz RDN, JASON  RD Office Phone: [phone]

## 2020-10-20 NOTE — PROGRESS NOTES
SW contacted patient and his wife, Hollie Tomas, to introduce SW services and review psychosocial distress screen. SW offered assistance. Patient has a good support system in place. Patient with Medicare insurance in place with a BCBS supplement. SW contact provided and SW will follow patient as needed. Kat Zayas.  Lela Ferrer

## 2020-11-02 NOTE — PROGRESS NOTES
Nutrition Assessment     Type and Reason for Visit:  PGSGA of 6, weight loss, poor appetite    Nutrition Assessment: Patient reports increase in food intake and good appetite. Patient has gained weight since consultation - he is not thrilled about weight gains and states he does not want to be more than 160 lb. Patient has difficulty swallowing at times (usually with meats) likely related to parkinson's side effects - would recommend a SLP evaluation. Patient looks well nourished at this time and denies n/v/d/c at this time. Malnutrition Assessment:  Malnutrition Status:  No malnutrition    Estimated Daily Nutrient Needs:  Energy (kcal): 0465-3308  ([place] [address] Jeor x AF 1.1-1.3)    Protein (g): 76-91g  (1.1-1.3 g/kg)     Fluid (ml/day): 1 mL/kcal    Nutrition Related Findings:  Difficulty swallowing    Current Nutrition Therapies:    General diet - pureed meats as needed. Sift easy to swallow foods    Anthropometric Measures:  · Height:   5' 6\" (167.6 cm)   · Current Body Wt:  169 lb (76.8 kg) - pt. Stated from weight taken today    Nutrition Diagnosis:   Difficulty swallowing related to parkinson's disease as evidenced by patient report of occasional coughing after swallowing and feeling like food is stuck. Nutrition Interventions:   Food and/or Nutrient Delivery:   Small frequent meals, follow dysphagia guidelines if symptoms started to arise  Nutrition Education/Counseling:    Provided education on dysphagia and poor appetite    Goals:  Decrease side effects of cancer treatment and problems that affect nutrition; Keep up or improve the patient's quality of life     Nutrition Monitoring and Evaluation:   Food/Nutrient Intake Outcomes:   Monitor PO intake/appetite  Physical Signs/Symptoms Outcomes:   n/v/d/c, swallowing difficulties, weight    Some areas of assessment may be incomplete due to COVID-19 precautions.     Xenia Ivan RDN, ESSIEN  RD Office Phone: [phone]

## 2020-11-02 NOTE — PROGRESS NOTES
Patient: Gema Becker     : 1950      Date of Service: 2020    [facility] Oncology  On Treatment Visit (OTV) Note    Diagnosis: Cancer Staging  No matching staging information was found for the patient. Dose Accrued: Completed 900 out of 3000 cGy 3 out of 10 treatment. S: No complaint  O: /63   Pulse 63   Temp 96.6 °F (35.9 °C) (Oral)   Resp 16   Wt 169 lb (76.7 kg)   SpO2 95%   BMI 27.28 kg/m² . Well-appearing, in no apparent distress, alert and fully oriented, answers questions appropriately. No signs of acute radiation-induced toxicity on physical exam.    IGRT: Set-up images acquired to ensure daily treatment accuracy and precision were reviewed by the physician. A&P: Continue radiotherapy as planned. Moisturize treated area as instructed. We reviewed reasonably anticipated side effects in the upcoming weeks, as well as expected trajectory of recovery. Patient verbalized a good understanding to everything.     Electronically signed by Jina Leigh MD on 2020 at 4:01 PM

## 2020-11-09 NOTE — PROGRESS NOTES
Patient: Deloris Shell     : 1950      Date of Service: 2020    107 [facility] Oncology  On Treatment Visit (OTV) Note    Diagnosis: Cancer Staging  No matching staging information was found for the patient. Dose Accrued: Completed 2400 cGy out of 3000 cGy, 8 out of 10 treatments +300 out of 900 cGy. 1 out of 3 treatments    S: Feeling well denied any major symptoms has very slight dysphagia    O: /66   Pulse 63   Temp 97.2 °F (36.2 °C) (Oral)   Resp 20   Wt 169 lb (76.7 kg)   SpO2 98%   BMI 27.28 kg/m² . Well-appearing, in no apparent distress, alert and fully oriented, answers questions appropriately. No signs of acute radiation-induced toxicity on physical exam.    IGRT: Set-up images acquired to ensure daily treatment accuracy and precision were reviewed by the physician. A&P: Continue radiotherapy as planned. Moisturize treated area as instructed. We reviewed reasonably anticipated side effects in the upcoming weeks, as well as expected trajectory of recovery. Patient verbalized a good understanding to everything.     Electronically signed by Susana Fuller MD on 2020 at 6:14 PM

## 2020-11-09 NOTE — PROGRESS NOTES
Marioneduar Herrera  11/9/2020  Wt Readings from Last 3 Encounters:   11/09/20 169 lb (76.7 kg)   11/02/20 169 lb (76.7 kg)   02/27/20 140 lb 12.8 oz (63.9 kg)     Body mass index is 27.28 kg/m². PT here for OTV. No significant changes. Dr. Tai Cristina to [facility].    Treatment Area:lung    Patient was seen today for weekly visit. Comfort Alteration  Fatigue: Moderate    Ventilation Alterations  Cough: Yes  Hemoptysis: No  Mucus Color: yellow  Dyspnea: No  O2 Sat: 98%    Nutritional Alteration  Anorexia: No  Nausea: No   Vomiting: No     Mucous Membrane Alteration  Voice Changes/ Stridor/Larynx: no  Pharynx & Esophagus: yes, a little bit    Elimination Alterations  Constipation: no  Diarrhea:  no    Skin Alteration   Sensation:none    Radiation Dermatitis:  Intact [x]     Erythema  []     Discoloration  []     Rash []     Dry desquamation  []     Moist desquamation []       Emotional  Coping: effective      Injury, potential bleeding or infection: none    Lab Results   Component Value Date    WBC 6.26 07/09/2020     07/09/2020         /66   Pulse 63   Temp 97.2 °F (36.2 °C) (Oral)   Resp 20   Wt 169 lb (76.7 kg)   SpO2 98%   BMI 27.28 kg/m²   Patient Currently in Pain: No               Assessment/Plan: Patient was seen today for weekly visit.       Radha Adams

## 2020-11-16 NOTE — PROGRESS NOTES
Patient: Wanda Amanda     : 1950      Date of Service: 2020    107 [facility] Oncology  On Treatment Visit (OTV) Note    Diagnosis: Cancer Staging  No matching staging information was found for the patient. Dose Accrued: Completed 3000 out of 3000 cGy, 10 out of 2010 treatments plus 3 out of 5 treatment as a boost, 900 out of 1500 cGy. S: Mild degree of dysphagia does not require medications  O: /72   Pulse 62   Temp 97 °F (36.1 °C) (Oral)   Resp 16   Wt 172 lb (78 kg)   SpO2 96%   BMI 27.76 kg/m² . Well-appearing, in no apparent distress, alert and fully oriented, answers questions appropriately. No signs of acute radiation-induced toxicity on physical exam.    IGRT: Set-up images acquired to ensure daily treatment accuracy and precision were reviewed by the physician. A&P: Stable doing well mild degree of dysphagia does not require medical treatment continue radiotherapy as planned. Moisturize treated area as instructed. We reviewed reasonably anticipated side effects in the upcoming weeks, as well as expected trajectory of recovery. Patient verbalized a good understanding to everything.     Electronically signed by Jamshid Nieto MD on 2020 at 6:03 PM

## 2020-12-09 NOTE — TELEPHONE ENCOUNTER
Writer spoke with wife, Abbie Nam, on the phone. Wife told writer that Pt was \"sleeping. \" Wife acknowledged some of Pt's challenges. She invited writer to call Pt in the afternoon (after 4 p.m.). Writer will attempt to call Patient in the afternoon.     Electronically signed by Shanika Adams, Oncology Outpatient [facility] 19, [address] [facility] Radiation Oncology  12/9/2020  3:42 PM

## 2020-12-17 NOTE — TELEPHONE ENCOUNTER
Writer spoke briefly with Mr. Bret Cristina on the phone. Writer introduced herself and asked how Pt was doing. Pt inquired reason for writer's call. Writer clarified to offer support. Pt reported, \"I'm doing fine. \" He thanked writer for calling and ended the conversation. Writer offered words of support and told Pt to pass along her greetings to Pt's wife, with whom writer spoke last week. Pt told writer he would.     Electronically signed by Pedro Silva, Oncology Outpatient [facility] 54, [address] [facility] Radiation Oncology  12/17/2020  5:09 PM

## 2020-12-23 NOTE — TELEPHONE ENCOUNTER
Name: Gisel Puente  MRN: [id number]  Date: 12/23/2020    Diagnosis: Cancer Staging  No matching staging information was found for the patient. Treatment Completion Date: 11-18-20    Subjective: The patient's spouse answered and stated patient was sleeping. She reported patient is tired and does still have a cough. He had a recent CT at [facility] and he will be getting a chemo pill and getting palliative care on board.     Confirmed RO follow-up appointment:   [x]   Yes  []   No  []   N/A    Confirmed follow-up appointments with other referring physicians:   [x]   Yes  []   No  []   N/A    Instructions: none  Reviewed and approved by:

## 2021-01-01 ENCOUNTER — HOSPITAL ENCOUNTER (OUTPATIENT)
Age: 71
Setting detail: SPECIMEN
Discharge: HOME | End: 2021-01-12
Payer: MEDICARE

## 2021-01-01 ENCOUNTER — TELEPHONE (OUTPATIENT)
Dept: RADIATION ONCOLOGY | Facility: MEDICAL CENTER | Age: 71
End: 2021-01-01

## 2021-01-01 ENCOUNTER — OFFICE VISIT (OUTPATIENT)
Dept: FAMILY MEDICINE CLINIC | Age: 71
End: 2021-01-01
Payer: MEDICARE

## 2021-01-01 ENCOUNTER — HOSPITAL ENCOUNTER (OUTPATIENT)
Dept: RADIATION ONCOLOGY | Facility: MEDICAL CENTER | Age: 71
Discharge: HOME | End: 2021-03-04
Payer: MEDICARE

## 2021-01-01 VITALS
OXYGEN SATURATION: 94 % | HEART RATE: 89 BPM | BODY MASS INDEX: 26.03 KG/M2 | SYSTOLIC BLOOD PRESSURE: 113 MMHG | RESPIRATION RATE: 16 BRPM | WEIGHT: 161.25 LBS | TEMPERATURE: 98.6 F | DIASTOLIC BLOOD PRESSURE: 77 MMHG

## 2021-01-01 VITALS
HEIGHT: 66 IN | SYSTOLIC BLOOD PRESSURE: 88 MMHG | TEMPERATURE: 96.8 F | WEIGHT: 162 LBS | DIASTOLIC BLOOD PRESSURE: 62 MMHG | OXYGEN SATURATION: 98 % | BODY MASS INDEX: 26.03 KG/M2 | HEART RATE: 65 BPM

## 2021-01-01 DIAGNOSIS — J42 CHRONIC BRONCHITIS, UNSPECIFIED CHRONIC BRONCHITIS TYPE (HCC): ICD-10-CM

## 2021-01-01 DIAGNOSIS — C34.11 MALIGNANT NEOPLASM OF UPPER LOBE, RIGHT BRONCHUS OR LUNG (HCC): ICD-10-CM

## 2021-01-01 DIAGNOSIS — G20 PARKINSON DISEASE (HCC): ICD-10-CM

## 2021-01-01 DIAGNOSIS — E03.9 HYPOTHYROIDISM, UNSPECIFIED TYPE: ICD-10-CM

## 2021-01-01 DIAGNOSIS — E03.9 HYPOTHYROIDISM, UNSPECIFIED TYPE: Primary | ICD-10-CM

## 2021-01-01 DIAGNOSIS — I50.31 ACUTE DIASTOLIC CONGESTIVE HEART FAILURE (HCC): ICD-10-CM

## 2021-01-01 DIAGNOSIS — F31.2 SEVERE MANIC BIPOLAR I DISORDER WITH PSYCHOTIC FEATURES (HCC): ICD-10-CM

## 2021-01-01 LAB — TSH SERPL DL<=0.05 MIU/L-ACNC: 3.43 MIU/L (ref 0.3–5)

## 2021-01-01 PROCEDURE — G8484 FLU IMMUNIZE NO ADMIN: HCPCS | Performed by: NURSE PRACTITIONER

## 2021-01-01 PROCEDURE — 3017F COLORECTAL CA SCREEN DOC REV: CPT | Performed by: NURSE PRACTITIONER

## 2021-01-01 PROCEDURE — G8427 DOCREV CUR MEDS BY ELIG CLIN: HCPCS | Performed by: NURSE PRACTITIONER

## 2021-01-01 PROCEDURE — 99212 OFFICE O/P EST SF 10 MIN: CPT | Performed by: RADIOLOGY

## 2021-01-01 PROCEDURE — 1036F TOBACCO NON-USER: CPT | Performed by: NURSE PRACTITIONER

## 2021-01-01 PROCEDURE — 4040F PNEUMOC VAC/ADMIN/RCVD: CPT | Performed by: NURSE PRACTITIONER

## 2021-01-01 PROCEDURE — 1123F ACP DISCUSS/DSCN MKR DOCD: CPT | Performed by: NURSE PRACTITIONER

## 2021-01-01 PROCEDURE — 3023F SPIROM DOC REV: CPT | Performed by: NURSE PRACTITIONER

## 2021-01-01 PROCEDURE — 99214 OFFICE O/P EST MOD 30 MIN: CPT | Performed by: NURSE PRACTITIONER

## 2021-01-01 PROCEDURE — G8417 CALC BMI ABV UP PARAM F/U: HCPCS | Performed by: NURSE PRACTITIONER

## 2021-01-01 PROCEDURE — G8926 SPIRO NO PERF OR DOC: HCPCS | Performed by: NURSE PRACTITIONER

## 2021-01-01 RX ORDER — DOXEPIN HYDROCHLORIDE 6 MG/1
TABLET ORAL
COMMUNITY
Start: 2020-01-01 | End: 2021-01-01 | Stop reason: ALTCHOICE

## 2021-01-01 RX ORDER — POTASSIUM CHLORIDE 20 MEQ/1
40 TABLET, EXTENDED RELEASE ORAL DAILY
COMMUNITY
Start: 2020-01-01

## 2021-01-01 RX ORDER — BENZONATATE 100 MG/1
CAPSULE ORAL
COMMUNITY
Start: 2020-01-01

## 2021-01-01 RX ORDER — AMOXICILLIN 500 MG/1
CAPSULE ORAL
COMMUNITY
Start: 2021-01-01 | End: 2021-01-01 | Stop reason: ALTCHOICE

## 2021-01-01 RX ORDER — LEVOTHYROXINE SODIUM 0.05 MG/1
TABLET ORAL
Qty: 90 TABLET | Refills: 1 | Status: SHIPPED | OUTPATIENT
Start: 2021-01-01

## 2021-01-01 RX ORDER — FUROSEMIDE 40 MG/1
40 TABLET ORAL DAILY
COMMUNITY
Start: 2020-01-01

## 2021-01-06 NOTE — TELEPHONE ENCOUNTER
I tried to call Bailee Anguiano to change the date and time of his follow up. No answer, had to leave a message.

## 2021-01-12 PROBLEM — I48.92 ATRIAL FLUTTER (HCC): Status: ACTIVE | Noted: 2020-01-01

## 2021-01-12 PROBLEM — R91.8 LUNG MASS: Status: ACTIVE | Noted: 2020-01-01

## 2021-01-12 PROBLEM — F31.9 BIPOLAR DISORDER (HCC): Status: ACTIVE | Noted: 2019-03-04

## 2021-01-12 PROBLEM — I48.92 ATRIAL FLUTTER (HCC): Status: RESOLVED | Noted: 2020-01-01 | Resolved: 2021-01-01

## 2021-01-12 PROBLEM — I50.9 ACUTE CHF (HCC): Status: ACTIVE | Noted: 2020-01-01

## 2021-01-12 PROBLEM — I34.0 NONRHEUMATIC MITRAL VALVE REGURGITATION: Status: ACTIVE | Noted: 2020-01-01

## 2021-01-12 NOTE — PROGRESS NOTES
[facility]  1965 [address] [facility] [address]  [phone]  [phone]    Deonna Jimenez is a 78 y.o. male who is here with c/o of:    Chief Complaint: [address] (previous pcp Dr Kristen Butler) and Medication Refill      Patient Accompanied by: patient and wife    HPI - Deonna Jimenez is here today with c/o:    Patient here to establish care. Previous PCP: Dr Cora Marquez  : Yes  Children: Yes  Employed: Retired  Exercise: No  Diet: Eats a balanced diet  Smoker: Former smoker Quit in 1998  Alcohol: No  Sleep: Averages 8+ hours    Chronic Conditions:    Lung ca  CHF  Bipolar  Parkinsons  Hypothyroid      Specialists:    [address]  Radiologist  Cardiology- [address] [place] [address]  Neurology- [facility] Concerns:    None. Due for TSH testing and refill of medication.      Health Maintenance Due   Topic Date Due    Diabetes screen  08/27/1992    Colon cancer screen colonoscopy  08/27/2002    Annual Wellness Visit (AWV)  07/02/2020        Patient Active Problem List:     ASHD (arteriosclerotic heart disease)     Hypothyroidism     MVP (mitral valve prolapse)     Rosacea     Bronchitis, chronic (HCC)     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     Acute CHF ([address]     Atrial flutter ([address]     Bipolar disorder ([address]     Lung mass     Nonrheumatic mitral valve regurgitation     Past Medical History:   Diagnosis Date    A-fib ([address]     Bipolar 1 disorder ([address]     CAD (coronary artery disease)     Cancer ([address]     Hyperlipidemia     Hypertension     MVP (mitral valve prolapse)     Parkinson disease ([address]     Rosacea     Thyroid disease       Past Surgical History:   Procedure Laterality Date    CHOLECYSTECTOMY      CORONARY ARTERY BYPASS GRAFT      FINGER AMPUTATION      left index    LUNG BIOPSY      x2 Dr. Christopher Gunn and Ryan Chapin     Family History · Hematological:  Negative for adenopathy. Does not bruise/bleed easily. · Psychiatric/Behavioral: Negative for self-injury, sleep disturbance and suicidal ideas. Objective   Physical Exam   PHYSICAL EXAM:   · Constitutional: Miguelito Limon is oriented to person, place, and time. Vital signs are normal. Appears well-developed and well-nourished. · HEENT:   · Head: Normocephalic and atraumatic. Eyes:PERRL, EOMI, Conjunctiva normal, No discharge. · Neck: Full passive range of motion. Non-tender on palpation. Neck supple. No thyromegaly present. Trachea normal.  · Cardiovascular: Normal rate, regular rhythm, S1, S2, no murmur, no gallop, no friction rub, intact distal pulses. · Pulmonary/Chest: Rhonchi left upper lobe, No respiratory distress, No wheezing, No chest tenderness. Effort normal  · Abdominal: Soft. Normal appearance, bowel sounds are present and normoactive. There is no hepatosplenomegaly. There is no tenderness. There is no CVA tenderness. · Musculoskeletal: Extremities appear regular and symmetric. No evident masses, lesions, foreign bodies, or other abnormalities. No edema. No tenderness on palpation. Joints are stable. Full ROM, strength and tone are within normal limits. Ambulates with cane   · Neurological: Alert and oriented to person, place, and time. Normal motor function, Normal sensory function, No focal deficits noted. He has normal strength. · Skin: Skin is warm, dry and intact. No obvious lesions on exposed skin  · Psychiatric: Depressed mood and flat affect. Speech is normal and behavior is normal.     Nursing note and vitals reviewed. Blood pressure 88/62, pulse 65, temperature 96.8 °F (36 °C), temperature source Temporal, height 5' 6\" (1.676 m), weight 162 lb (73.5 kg), SpO2 98 %. Body mass index is 26.15 kg/m².     Wt Readings from Last 3 Encounters:   01/12/21 162 lb (73.5 kg)   11/16/20 172 lb (78 kg)   11/09/20 169 lb (76.7 kg)     BP Readings from Last 3 Encounters: 01/12/21 88/62   11/16/20 119/72   11/09/20 113/66       Orders Only on 07/08/2020   Component Date Value Ref Range Status    WBC 07/08/2020 8.72  4.00 - 10.60 10*3/uL Final    RBC 07/08/2020 4.27  4.20 - 5.70 10*6/uL Final    Hemoglobin 07/08/2020 13.3  13.0 - 17.0 g/dL Final    Hematocrit 07/08/2020 41.3  39.0 - 50.0 % Final    MCV 07/08/2020 96.7  82.0 - 98.0 fL Final    MCH 07/08/2020 31.1  27.0 - 33.0 pg Final    MCHC 07/08/2020 32.2  32.0 - 35.0 g/dL Final    RDW 07/08/2020 15.5* 11.5 - 15.0 % Final    Platelets 88/15/3621 226  150 - 400 10*3/uL Final    Neutrophils % 07/08/2020 82.4* 40.0 - 72.0 % Final    Immature Granulocytes 07/08/2020 0.5  0.0 - 1.0 % Final    Lymphocytes % 07/08/2020 6.3* 20.0 - 45.0 % Final    Monocytes % 07/08/2020 9.9  5.0 - 12.0 % Final    Eosinophils % 07/08/2020 0.7  0.0 - 6.0 % Final    Basophils % 07/08/2020 0.2  0.0 - 1.0 % Final    Neutrophils Absolute 07/08/2020 7.2  1.6 - 7.6 10*3/uL Final    Absolute Immature Granulocyte 07/08/2020 0.0  0.0 - 0.2 10*3/uL Final    Lymphocytes Absolute 07/08/2020 0.6* 1.2 - 4.0 10*3/uL Final    Monocytes Absolute 07/08/2020 0.9  0.1 - 1.0 10*3/uL Final    Eosinophils Absolute 07/08/2020 0.1  0.0 - 0.5 10*3/uL Final    Basophils Absolute 07/08/2020 0.0  0.0 - 0.2 10*3/uL Final    nRBC 07/08/2020 0  0 - 0 % Final    Color, UA 07/08/2020 YELLOW  YELLOW Final    Appearance 07/08/2020 CLEAR  CLEAR Final    Specific Gravity, Urine 07/08/2020 1.024* 1.015 - 1.02 Final    pH 07/08/2020 6.0  5.0 - 8.0 Final    Protein, Urine 07/08/2020 30* NEGATIVE mg/dL Final    Glucose, UA 07/08/2020 NEGATIVE  NEGATIVE mg/dL Final    Ketones, Urine 07/08/2020 NEGATIVE  NEGATIVE mg/dL Final    Bilirubin Urine 07/08/2020 NEGATIVE  NEGATIVE Final    Blood, Urine 07/08/2020 NEGATIVE  NEGATIVE Final    Nitrite, Urine 07/08/2020 NEGATIVE  NEGATIVE Final    Leukocyte Esterase, Urine 07/08/2020 NEGATIVE  NEGATIVE Final  T3, Free 07/08/2020 2.8  2.5 - 3.9 pg/mL Final    TSH, 3RD GENERATION 07/08/2020 2.21  0.34 - 5.60 uIU/mL Final    Ethanol Lvl 07/08/2020 NONE DETECTED  mg/dL Final    Divide by 1000 to convert mg/dL to percent. Example: 100mg/dL = 0.1%.     Magnesium 07/08/2020 2.2  1.9 - 2.7 mg/dL Final    SARS-CoV-2 07/08/2020 Not Detected  Not Detect Final    Total CK 07/08/2020 178  30 - 223 IU/L Final    Folate 07/08/2020 13.56  6.60 - 1000 ng/mL Final    Comment: Normal range reflects World Health Organization International Standard  03/178      Vitamin B-12 07/08/2020 394  180 - 914 pg/mL Final    Comment: REFERENCE RANGES:  180-914 pg/mL  Normal  145-179 pg/mL  Indeterminate  <145  pg/mL  Deficient      WBC 07/09/2020 6.26  4.00 - 10.60 10*3/uL Final    RBC 07/09/2020 4.15* 4.20 - 5.70 10*6/uL Final    Hemoglobin 07/09/2020 12.7* 13.0 - 17.0 g/dL Final    Hematocrit 07/09/2020 41.1  39.0 - 50.0 % Final    MCV 07/09/2020 99.0* 82.0 - 98.0 fL Final    MCH 07/09/2020 30.6  27.0 - 33.0 pg Final    MCHC 07/09/2020 30.9* 32.0 - 35.0 g/dL Final    RDW 07/09/2020 15.5* 11.5 - 15.0 % Final    Platelets 65/32/8529 195  150 - 400 10*3/uL Final    Neutrophils % 07/09/2020 75.0* 40.0 - 72.0 % Final    Immature Granulocytes 07/09/2020 0.3  0.0 - 1.0 % Final    Lymphocytes % 07/09/2020 13.7* 20.0 - 45.0 % Final    Monocytes % 07/09/2020 9.1  5.0 - 12.0 % Final    Eosinophils % 07/09/2020 1.4  0.0 - 6.0 % Final    Basophils % 07/09/2020 0.5  0.0 - 1.0 % Final    Neutrophils Absolute 07/09/2020 4.7  1.6 - 7.6 10*3/uL Final    Absolute Immature Granulocyte 07/09/2020 0.0  0.0 - 0.2 10*3/uL Final    Lymphocytes Absolute 07/09/2020 0.9* 1.2 - 4.0 10*3/uL Final    Monocytes Absolute 07/09/2020 0.6  0.1 - 1.0 10*3/uL Final    Eosinophils Absolute 07/09/2020 0.1  0.0 - 0.5 10*3/uL Final    Basophils Absolute 07/09/2020 0.0  0.0 - 0.2 10*3/uL Final    nRBC 07/09/2020 0  0 - 0 % Final 1.  Steven received counseling on the following healthy behaviors: medication adherence  2. Patient given educational materials - see patient instructions  3. Was a self-tracking handout given in paper form or via [employer]? No  If yes, see orders or list here. 4.  Discussed use, benefit, and side effects of prescribed medications. Barriers to medication compliance addressed. All patient questions answered. Pt voiced understanding. 5.  Reviewed prior labs, imaging, consultation, follow up, and health maintenance  6. Continue current medications, diet and exercise. 7. Discussed use, benefit, and side effects of prescribed medications. Barriers to medication compliance addressed. All her questions were answered. Pt voiced understanding. Bailee Anguiano will continue current medications, diet and exercise. Of the 30 minute duration appointment visit, Anjum Gonzalez CNP spent at least 50% of the face-to-face time in counseling, explanation of diagnosis, planning of further management, and answering all questions.           Signed:  Anjum Gonzalez CNP

## 2021-03-04 NOTE — PROGRESS NOTES
[]   Patient declined    Smoking Status:    [] Smoker - PPD:   [x] Nonsmoker - Quit [id number]               [] Never a smoker      Cancer Screening:  Colonoscopy   [] Current       [] Not current   [] Not current, but scheduled   [x] NA  Mammogram   [] Current       [] Not current   [] Not current, but scheduled   [x] NA  Prostate           [] Current       [] Not current   [] Not current, but scheduled   [x] NA  PAP/Pelvic      [] Current       [] Not current   [] Not current, but scheduled   [x] NA  Skin                 [] Current       [] Not current   [] Not current, but scheduled   [x] NA     Hormone:  Lupron []   Last dose given:           Next dose due:   Eligard []   Last dose given:           Next dose due:   Aromatase Inhibitors []   Medication name:   N/A:  [x]             *BREAST Patient only:    Lymphedema Eval:   [] left arm      [] right arm  Location:     Measurement (cm)    Upper Bicep :    Lower Bicep :         FALLS RISK SCREEN  Instructions:  Assess the patient and enter the appropriate indicators that are present for fall risk identification. Total the numbers entered and assign a fall risk score from Table 2.  Reassess patient at a minimum every 12 weeks or with status change. Assessment   Date  3/4/2021     1. Mental Ability: confusion/cognitively impaired 0     2. Elimination Issues: incontinence, frequency 0       3. Ambulatory: use of assistive devices (walker, cane, off-loading devices),        attached to equipment (IV pole, oxygen) 2     4. Sensory Limitations: dizziness, vertigo, impaired vision 0     5. Age less than 65        0     6. Age 72 or greater 1     7. Medication: diuretics, strong analgesics, hypnotics, sedatives,        antihypertensive agents 0   8. Falls:  recent history of falls within the last 3 months (not to include slipping or        tripping) 0   TOTAL 3    If score of 4 or greater was education given?  No           TABLE 2 Risk Score Risk Level Plan of Care   0-3 Little or  No Risk 1. Provide assistance as indicated for ambulation activities  2. Reorient confused/cognitively impaired patient  3. Chair/bed in low position, stretcher/bed with siderails up except when performing patient care activities  5. Educate patient/family/caregiver on falls prevention  6.  Reassess in 12 weeks or with any noted change in patient condition which places them at a risk for a fall   4-6 Moderate Risk 1. Provide assistance as indicated for ambulation activities  2. Reorient confused/cognitively impaired patient  3. Chair/bed in low position, stretcher/bed with siderails up except when performing patient care activities  4. Educate patient/family/caregiver on falls prevention     7 or   Higher High Risk 1. Place patient in easily observable treatment room  2. Patient attended at all times by family member or staff  3. Provide assistance as indicated for ambulation activities  4. Reorient confused/cognitively impaired patient  5. Chair/bed in low position, stretcher/bed with siderails up except when performing patient care activities  6.   Educate patient/family/caregiver on falls prevention         PLAN: Patient is seen today in follow up        Randy Blum

## 2021-03-05 NOTE — PROGRESS NOTES
FOLLOW UP NOTE:                                                                                                                                                                                                          Huseyin GUZMAN MD, ABR, FICS. Cell [phone]                                                                               Office Phone [address], [address] [place] [address]                            Date of Service: 3/4/2021            RADIATION ONCOLOGY FOLLOW UP NOTE    Patient ID:   Demetrius Elizabeth  : 1950   MRN: [id number]    DIAGNOSIS:  Cancer Staging  No matching staging information was found for the patient. INTERVAL HISTORY:     Feeling well basically his chest imaging showed response to radiation treatment however new lesions around the radiation field.     Currently he is asymptomatic      MEDICATIONS:    Current Outpatient Medications:     benzonatate (TESSALON) 100 MG capsule, , Disp: , Rfl:     furosemide (LASIX) 40 MG tablet, Take 40 mg by mouth daily, Disp: , Rfl:     potassium chloride (KLOR-CON M) 20 MEQ extended release tablet, Take 40 mEq by mouth daily, Disp: , Rfl:     levothyroxine (SYNTHROID) 50 MCG tablet, TAKE 1 TABLET DAILY, Disp: 90 tablet, Rfl: 1    atorvastatin (LIPITOR) 10 MG tablet, Take 10 mg by mouth daily, Disp: , Rfl:     OLANZapine (ZYPREXA) 10 MG tablet, Take 10 mg by mouth nightly, Disp: , Rfl:     metoprolol tartrate (LOPRESSOR) 25 MG tablet, Take 25 mg by mouth 2 times daily, Disp: , Rfl:   apixaban (ELIQUIS) 5 MG TABS tablet, Take by mouth 2 times daily, Disp: , Rfl:     carbidopa-levodopa (SINEMET)  MG per tablet, 1 tablet 3 times daily , Disp: , Rfl:     OXcarbazepine (TRILEPTAL) 300 MG tablet, , Disp: , Rfl:     simvastatin (ZOCOR) 20 MG tablet, TAKE 1 TABLET NIGHTLY, Disp: 90 tablet, Rfl: 4    ALPRAZolam (XANAX) 0.5 MG tablet, Take 1 tablet by mouth 3 times daily as needed, Disp: 270 tablet, Rfl: 0    docusate sodium (COLACE) 100 MG capsule, Take 100 mg by mouth 2 times daily as needed , Disp: , Rfl:     AZILECT 0.5 MG TABS, TAKE 1 TABLET DAILY, Disp: 30 tablet, Rfl: 1    aspirin 81 MG EC tablet, Take 81 mg by mouth daily. , Disp: , Rfl:     ALLERGIES:  Allergies   Allergen Reactions    Ambien [Zolpidem Tartrate]      Manic episode    Ropinirole Other (See Comments)     Manic episode    Zolpidem      Sleeping walking         REVIEW OF SYSTEMS:    A full 14 point review of systems was performed and assessed and found to be negative except as noted above. PHYSICAL EXAMINATION:  Looks well in no apparent pain or distress. There is no cervical or supraclavicular node enlargement chest examination revealed decreased air entry.     Remaining of his examinations unremarkable        ECOG:  VITAL SIGNS: /77   Pulse 89   Temp 98.6 °F (37 °C) (Oral)   Resp 16   Wt 161 lb 4 oz (73.1 kg)   SpO2 94%   BMI 26.03 kg/m²   [unfilled]      LABS:  WBC   Date Value Ref Range Status   07/09/2020 6.26 4.00 - 10.60 10*3/uL Final   02/27/2020 8.3 3.5 - 11.3 k/uL Final     Segs Absolute   Date Value Ref Range Status   02/27/2020 6.16 1.50 - 8.10 k/uL Final     Neutrophils Absolute   Date Value Ref Range Status   07/09/2020 4.7 1.6 - 7.6 10*3/uL Final     Hemoglobin   Date Value Ref Range Status   07/09/2020 12.7 (L) 13.0 - 17.0 g/dL Final     Platelets   Date Value Ref Range Status   07/09/2020 195 150 - 400 10*3/uL Final     No results found for: , CEA  PSA Date Value Ref Range Status   02/27/2020 1.01 <4.1 ug/L Final     Comment:     The Roche \"ECLIA\" assay is used. Results obtained with different assay methods cannot be   used interchangeably. IMAGING: enclosed. ASSESSMENT: Responding to his original cancer, renal cancer as outlined the irradiated field. Patient is asymptomatic  Angela Stockton is a 78 y.o. male with a Cancer Staging  No matching staging information was found for the patient. [facility] PLAN:     Patient will be seen by Dr. Rajesh Tariq for assessment for immunotherapy. patient is recommended to come back in 6 months for another follow up visit and exam, or can call to come see us sooner if symptoms change. Patient was in agreement with my recommendations. All questions were answered to their satisfaction. Patient was advised to contact us anytime should they have any questions or concerns. Electronically signed by Kody Moore MD on 3/4/2021 at 7:26 PM        Medications Prescribed:   New Prescriptions    No medications on file       Orders: No orders of the defined types were placed in this encounter.       CC:  Patient Care Team:  Kit Hastings MD as PCP - General (Family Medicine)  SYED Bustillos - JULIO as PCP - [facility]  Leatha Lozoya MD as Consulting Physician (Hematology and Oncology)  Caryn Hernandez MD as Consulting Physician (Radiation Oncology)